# Patient Record
Sex: MALE | Race: WHITE | URBAN - METROPOLITAN AREA
[De-identification: names, ages, dates, MRNs, and addresses within clinical notes are randomized per-mention and may not be internally consistent; named-entity substitution may affect disease eponyms.]

---

## 2017-06-16 ENCOUNTER — ALLSCRIPTS OFFICE VISIT (OUTPATIENT)
Dept: OTHER | Facility: OTHER | Age: 47
End: 2017-06-16

## 2017-06-16 LAB
BILIRUB UR QL STRIP: NEGATIVE
CLARITY UR: NORMAL
COLOR UR: YELLOW
GLUCOSE (HISTORICAL): NEGATIVE
HGB UR QL STRIP.AUTO: NORMAL
KETONES UR STRIP-MCNC: NEGATIVE MG/DL
LEUKOCYTE ESTERASE UR QL STRIP: NEGATIVE
NITRITE UR QL STRIP: NEGATIVE
PH UR STRIP.AUTO: 6 [PH]
PROT UR STRIP-MCNC: NEGATIVE MG/DL
SP GR UR STRIP.AUTO: 1
UROBILINOGEN UR QL STRIP.AUTO: NEGATIVE

## 2017-06-18 DIAGNOSIS — R31.29 OTHER MICROSCOPIC HEMATURIA: ICD-10-CM

## 2017-07-14 ENCOUNTER — GENERIC CONVERSION - ENCOUNTER (OUTPATIENT)
Dept: OTHER | Facility: OTHER | Age: 47
End: 2017-07-14

## 2017-07-15 LAB
BACTERIA UR QL AUTO: NORMAL
BILIRUB UR QL STRIP: NEGATIVE
COLOR UR: YELLOW
COMMENT (HISTORICAL): CLEAR
FECAL OCCULT BLOOD DIAGNOSTIC (HISTORICAL): NEGATIVE
GLUCOSE (HISTORICAL): NEGATIVE
KETONES UR STRIP-MCNC: NEGATIVE MG/DL
LEUKOCYTE ESTERASE UR QL STRIP: NEGATIVE
MICROSCOPIC EXAMINATION (HISTORICAL): NORMAL
MICROSCOPIC EXAMINATION (HISTORICAL): NORMAL
NITRITE UR QL STRIP: NEGATIVE
NON-SQ EPI CELLS URNS QL MICRO: NORMAL /HPF
PH UR STRIP.AUTO: 7 [PH] (ref 5–7.5)
PROT UR STRIP-MCNC: NEGATIVE MG/DL
RBC (HISTORICAL): NORMAL /HPF
SP GR UR STRIP.AUTO: 1.01 (ref 1–1.03)
URINALYSIS (UA) (HISTORICAL): NORMAL
UROBILINOGEN UR QL STRIP.AUTO: 0.2 EU/DL (ref 0.2–1)
WBC # BLD AUTO: NORMAL /HPF

## 2018-01-13 NOTE — PROGRESS NOTES
Assessment    1  Benign essential HTN (401 1) (I10)   2  Dyslipidemia (272 4) (E78 5)   3  Microscopic hematuria (599 72) (R31 29)    Plan   Benign essential HTN    · Benazepril HCl - 10 MG Oral Tablet; TAKE 1 TABLET DAILY AS DIRECTED  Health Maintenance    · Urine Dip Non-Automated- POC; Status:Complete - Retrospective By Protocol  Authorization;   Done: [id number] 08:46AM  Microscopic hematuria    · (1) URINALYSIS w URINE C/S REFLEX (will reflex a microscopy if leukocytes, occult  blood, or nitrites are not within normal limits); Status:Active; Requested AQB:[id number];    · (1) URINE CULTURE; Source:Urine, Clean Catch; Status:Active; Requested  CQD:[id number];     [address] [facility] [place] [employer] [facility]  *  Status: Hold For - Scheduling  Requested for: [id number]  Ordered; For: FamHx: Family history of myocardial infarction;  Ordered By: Urbano Riedel  Performed:   Due: [id number]     Discussion/Summary  Impression: health maintenance visit  Prostate cancer screening: the risks and benefits of prostate cancer screening were discussed and PSA is not indicated  Testicular cancer screening: the risks and benefits of testicular cancer screening were discussed and monthly self testicular exam was advised  Colorectal cancer screening: the risks and benefits of colorectal cancer screening were discussed and colorectal cancer screening is not indicated  Screening lab work includes glucose  Possible side effects of new medications were reviewed with the patient/guardian today  The treatment plan was reviewed with the patient/guardian  The patient/guardian understands and agrees with the treatment plan      Chief Complaint  Patient presents for an annual physical and to establish in the practice  History of Present Illness  HM, Adult Male: The patient is being seen for a health maintenance evaluation  General Health: The patient's health since the last visit is described as good  He has regular dental visits  He denies vision problems  He denies hearing loss  Lifestyle:  He consumes a diverse and healthy diet  He has weight concerns  He exercises regularly  He does not use tobacco  He consumes alcohol  He denies drug use  Screening: Prostate cancer screening includes no previous evaluation  Testicular cancer screening includes monthly self testicular examinations  Colorectal cancer screening includes no previous screening  Metabolic screening includes lipid profile performed within the past five years and glucose screening performed last year  Cardiovascular risk factors: hypertension, diabetes and obesity, but no tobacco use and no illicit drug use  General health risks: asbestos exposure, but no elevated prostate-specific antigen and no radiation exposure  Safety elements used: safe driving habits, smoke detector, gun trigger locks and gun safe  Risk findings: guns at home  Review of Systems    Constitutional: No fever or chills, feels well, no tiredness, no recent weight gain or weight loss  Eyes: No complaints of eye pain, no red eyes, no discharge from eyes, no itchy eyes  ENT: no complaints of earache, no hearing loss, no nosebleeds, no nasal discharge, no sore throat, no hoarseness  Cardiovascular: No complaints of slow heart rate, no fast heart rate, no chest pain, no palpitations, no leg claudication, no lower extremity  Respiratory: No complaints of shortness of breath, no wheezing, no cough, no SOB on exertion, no orthopnea or PND        Past Medical History    · Denied: History of depression   · History of palpitations (V12 59) (Z87 898)   · Denied: History of substance abuse    Surgical History    · History of Surgery Vas Deferens Vasectomy    Family History  Mother    · Denied: Family history of depression   · Family history of hypertension (V17 49) (Z82 49)   · Family history of myocardial infarction (V17 3) (Z82 49)   · Family history of stroke (V17 1) (Z82 3)   · Denied: Family history of substance abuse  Father    · Denied: Family history of depression   · Family history of hypertension (V17 49) (Z82 49)   · Family history of myocardial infarction (V17 3) (Z82 49)   · Denied: Family history of substance abuse  Sister    · Family history of hypertension (V17 49) (Z82 49)  Maternal Grandmother    · Family history of colon cancer (V16 0) (Z80 0)    Social History    · Never a smoker   · No illicit drug use   · Occasional alcohol use    Current Meds   1  Aspir-81 81 MG Oral Tablet Delayed Release; Therapy: (Recorded:16Jun2017) to Recorded   2  Benazepril HCl - 10 MG Oral Tablet; Therapy: (Recorded:16Jun2017) to Recorded   3  Cholest Off Complete 450-75 MG TABS; Therapy: (Recorded:16Jun2017) to Recorded   4  Fish Oil CAPS; Therapy: (Recorded:16Jun2017) to Recorded   5  Krill Oil 1000 MG Oral Capsule; Therapy: (Recorded:16Jun2017) to Recorded   6  Multivitamins CAPS; Therapy: (Recorded:16Jun2017) to Recorded   7  Zyrtec 10 MG TABS; Therapy: (Recorded:16Jun2017) to Recorded    Allergies    1  No Known Drug Allergies    Vitals   Recorded: [id number] 08:26AM   Temperature 98 2 F, Tympanic   Heart Rate 79, L Brachial Artery   Pulse Quality Normal, L Brachial Artery   Respiration Quality Normal   Respiration 16   Systolic 410, LUE, Sitting   Diastolic 78, LUE, Sitting   BP CUFF SIZE Large   Height 5 ft 5 5 in   Weight 198 lb 8 oz   BMI Calculated 32 53   BSA Calculated 1 98   O2 Saturation 98     Physical Exam    Constitutional   General appearance: No acute distress, well appearing and well nourished  Head and Face   Head and face: Normal     Palpation of the face and sinuses: No sinus tenderness  Eyes   Conjunctiva and lids: No erythema, swelling or discharge  Pupils and irises: Equal, round, reactive to light      Ears, Nose, Mouth, and Throat   External inspection of ears and nose: Normal     Otoscopic examination: Tympanic membranes translucent with normal light reflex  Canals patent without erythema  Hearing: Normal     Nasal mucosa, septum, and turbinates: Normal without edema or erythema  Lips, teeth, and gums: Normal, good dentition  Oropharynx: Normal with no erythema, edema, exudate or lesions  Neck   Neck: Supple, symmetric, trachea midline, no masses  Thyroid: Normal, no thyromegaly  Pulmonary   Respiratory effort: No increased work of breathing or signs of respiratory distress  Percussion of chest: Normal     Palpation of chest: Normal     Auscultation of lungs: Clear to auscultation  Cardiovascular   Auscultation of heart: Normal rate and rhythm, normal S1 and S2, no murmurs  Carotid pulses: 2+ bilaterally  Abdomen   Abdomen: Non-tender, no masses  Liver and spleen: No hepatomegaly or splenomegaly  Examination for hernias: No hernias appreciated  Results/Data  Urine Dip Non-Automated- POC [id number] 08:46AM Clay Miranda     Test Name Result Flag Reference   Color Yellow     Clarity Transparent     Leukocytes NEGATIVE     Nitrite NEGATIVE     Blood TRACE     Bilirubin NEGATIVE     Urobilinogen NEGATIVE     Protein NEGATIVE     Ph 6     Specific [place] 1 005     Ketone NEGATIVE     Glucose NEGATIVE         Procedure    Procedure: Hearing Acuity Test    Indication: Routine screeing  Audiometry:   Hearing in the right ear: 40 decibals at 500 hertz, 40 decibals at 1000 hertz, 20 decibals at 2000 hertz and 40 decibals at 4000 hertz  Hearing in the left ear: 20 decibals at 500 hertz, 20 decibals at 1000 hertz, 20 decibals at 2000 hertz and 40 decibals at 4000 hertz  Procedure: Visual Acuity Test    Indication: routine screening     Results: 20/20 in both eyes with corrective device, 20/20 in the right eye with corrective device, 20/20 in the left eye with corrective device   Color vision was and the results were normal       Signatures   Electronically signed by : Ezio Landers DO; Jun 18 2017 11:24AM EST (Author)

## 2018-01-15 VITALS
HEART RATE: 79 BPM | RESPIRATION RATE: 16 BRPM | BODY MASS INDEX: 31.9 KG/M2 | WEIGHT: 198.5 LBS | OXYGEN SATURATION: 98 % | DIASTOLIC BLOOD PRESSURE: 78 MMHG | SYSTOLIC BLOOD PRESSURE: 104 MMHG | TEMPERATURE: 98.2 F | HEIGHT: 66 IN

## 2018-05-17 DIAGNOSIS — I10 ESSENTIAL HYPERTENSION: Primary | ICD-10-CM

## 2018-05-17 RX ORDER — BENAZEPRIL HYDROCHLORIDE 10 MG/1
1 TABLET ORAL DAILY
COMMUNITY
End: 2018-05-17 | Stop reason: SDUPTHER

## 2018-05-21 RX ORDER — BENAZEPRIL HYDROCHLORIDE 10 MG/1
10 TABLET ORAL DAILY
Qty: 30 TABLET | Refills: 0 | Status: SHIPPED | OUTPATIENT
Start: 2018-05-21 | End: 2018-07-09 | Stop reason: SDUPTHER

## 2018-06-25 DIAGNOSIS — I10 ESSENTIAL HYPERTENSION: ICD-10-CM

## 2018-06-25 RX ORDER — BENAZEPRIL HYDROCHLORIDE 10 MG/1
10 TABLET ORAL DAILY
Qty: 90 TABLET | Refills: 0 | OUTPATIENT
Start: 2018-06-25

## 2018-06-25 RX ORDER — BENAZEPRIL HYDROCHLORIDE 10 MG/1
10 TABLET ORAL DAILY
Qty: 30 TABLET | Refills: 0 | OUTPATIENT
Start: 2018-06-25

## 2018-07-09 DIAGNOSIS — I10 ESSENTIAL HYPERTENSION: ICD-10-CM

## 2018-07-09 RX ORDER — BENAZEPRIL HYDROCHLORIDE 10 MG/1
10 TABLET ORAL DAILY
Qty: 30 TABLET | Refills: 0 | Status: SHIPPED | OUTPATIENT
Start: 2018-07-09